# Patient Record
Sex: FEMALE | Race: WHITE | NOT HISPANIC OR LATINO | Employment: STUDENT | URBAN - METROPOLITAN AREA
[De-identification: names, ages, dates, MRNs, and addresses within clinical notes are randomized per-mention and may not be internally consistent; named-entity substitution may affect disease eponyms.]

---
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
## 2025-02-07 ENCOUNTER — OFFICE VISIT (OUTPATIENT)
Dept: URGENT CARE | Facility: CLINIC | Age: 20
End: 2025-02-07
Payer: COMMERCIAL

## 2025-02-07 VITALS
HEIGHT: 64 IN | TEMPERATURE: 98 F | DIASTOLIC BLOOD PRESSURE: 86 MMHG | OXYGEN SATURATION: 98 % | RESPIRATION RATE: 16 BRPM | SYSTOLIC BLOOD PRESSURE: 129 MMHG | HEART RATE: 82 BPM | WEIGHT: 210 LBS | BODY MASS INDEX: 35.85 KG/M2

## 2025-02-07 DIAGNOSIS — B37.2 CANDIDAL INTERTRIGO: ICD-10-CM

## 2025-02-07 DIAGNOSIS — L30.8 DERMATITIS ASSOCIATED WITH MOISTURE: Primary | ICD-10-CM

## 2025-02-07 DIAGNOSIS — L04.9 ACUTE LYMPHADENITIS: ICD-10-CM

## 2025-02-07 PROCEDURE — 99499 UNLISTED E&M SERVICE: CPT | Mod: S$GLB,,, | Performed by: NURSE PRACTITIONER

## 2025-02-07 RX ORDER — CLOTRIMAZOLE AND BETAMETHASONE DIPROPIONATE 10; .64 MG/G; MG/G
CREAM TOPICAL 2 TIMES DAILY
Qty: 15 G | Refills: 0 | Status: SHIPPED | OUTPATIENT
Start: 2025-02-07

## 2025-02-07 NOTE — PATIENT INSTRUCTIONS
Please follow up with your primary care provider within 2-5 days if your signs and symptoms have not resolved or worsen.     If your condition worsens or fails to improve we recommend that you receive another evaluation at the emergency room immediately or contact your primary medical clinic to discuss your concerns.   You must understand that you have received an Urgent Care treatment only and that you may be released before all of your medical problems are known or treated. You, the patient, will arrange for follow up care as instructed.     Use barrier to avoid skin on skin contact.  Use absorbent material or clothing such as cotton to separate skin folds.  Daily use of drying powders containing microporous cellulose.  Aerate the affected area when feasible.  Daily cleansing of the skin fold area with a mild cleanser followed by drying of skin with a hair dryer on a cool setting. You may apply Zinc Oxide cream to area to provide skin barrier.    Return to clinic next Monday or Tuesday for re-evaluation.

## 2025-02-07 NOTE — PROGRESS NOTES
"Subjective:      Patient ID: Simon Petty is a 19 y.o. female.    Vitals:  height is 5' 4" (1.626 m) and weight is 95.3 kg (210 lb). Her temperature is 97.9 °F (36.6 °C). Her blood pressure is 129/86 and her pulse is 82. Her respiration is 16 and oxygen saturation is 98%.     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 She says there is a little knot there as well that hurts to the touch. She does not believe there is any cause for an allergic reaction.     Rash  Pertinent negatives include no fatigue or fever.     Constitution: Negative for appetite change, chills, sweating, fatigue, fever and generalized weakness.   Skin:  Positive for rash and lesion. Negative for hives.   Allergic/Immunologic: Positive for itching. Negative for eczema and hives.   Neurological:  Negative for disorientation and altered mental status.   Psychiatric/Behavioral:  Negative for altered mental status, disorientation and confusion.       Objective:     Physical Exam   Constitutional: She is oriented to person, place, and time.   HENT:   Head: Normocephalic and atraumatic.   Mouth/Throat: Mucous membranes are moist.   Cardiovascular: Normal rate.   Pulmonary/Chest: Effort normal.   Abdominal: Normal appearance.   Lymphadenopathy:     Left upper body: No axillary adenopathy present.  Neurological: She is alert and oriented to person, place, and time.   Skin: Skin is warm, dry and rash.              Comments: Localized moist, slightly excoriated and erythematous patch under the left breast specifically located within the inframammary skin fold. The patch margins are dry with skin peeling but no fissuring. Few scattered erythematous satellite papules are present.   Just inferior to the dermatitis patch is a pinpoint palpable (not visible)rubbery nodule which is tender to touch.   No signs of secondary bacterial infection.   Psychiatric: Her behavior is normal. Mood normal.   Nursing note " and vitals reviewed.      Assessment:     1. Dermatitis associated with moisture    2. Candidal intertrigo    3. Acute lymphadenitis        Plan:       Dermatitis associated with moisture  -     clotrimazole-betamethasone 1-0.05% (LOTRISONE) cream; Apply topically 2 (two) times daily.  Dispense: 15 g; Refill: 0    Candidal intertrigo  -     clotrimazole-betamethasone 1-0.05% (LOTRISONE) cream; Apply topically 2 (two) times daily.  Dispense: 15 g; Refill: 0    Acute lymphadenitis          Medical Decision Making:   Urgent Care Management:  Suspect tender nodule to left chest wall (inferior to left breast) is reactive lymphadenitis 2/2 local cutaneous infection.  Will reassess on follow up.      Patient Instructions   Please follow up with your primary care provider within 2-5 days if your signs and symptoms have not resolved or worsen.     If your condition worsens or fails to improve we recommend that you receive another evaluation at the emergency room immediately or contact your primary medical clinic to discuss your concerns.   You must understand that you have received an Urgent Care treatment only and that you may be released before all of your medical problems are known or treated. You, the patient, will arrange for follow up care as instructed.     Use barrier to avoid skin on skin contact.  Use absorbent material or clothing such as cotton to separate skin folds.  Daily use of drying powders containing microporous cellulose.  Aerate the affected area when feasible.  Daily cleansing of the skin fold area with a mild cleanser followed by drying of skin with a hair dryer on a cool setting. You may apply Zinc Oxide cream to area to provide skin barrier.    Return to clinic next Monday or Tuesday for re-evaluation.

## 2025-02-10 ENCOUNTER — OFFICE VISIT (OUTPATIENT)
Dept: URGENT CARE | Facility: CLINIC | Age: 20
End: 2025-02-10
Payer: COMMERCIAL

## 2025-02-10 VITALS
HEIGHT: 64 IN | WEIGHT: 210 LBS | HEART RATE: 72 BPM | BODY MASS INDEX: 35.85 KG/M2 | SYSTOLIC BLOOD PRESSURE: 137 MMHG | OXYGEN SATURATION: 98 % | RESPIRATION RATE: 16 BRPM | DIASTOLIC BLOOD PRESSURE: 81 MMHG | TEMPERATURE: 98 F

## 2025-02-10 DIAGNOSIS — L04.9 ACUTE LYMPHADENITIS: Primary | ICD-10-CM

## 2025-02-10 DIAGNOSIS — B37.2 CANDIDAL INTERTRIGO: ICD-10-CM

## 2025-02-10 DIAGNOSIS — Z09 FOLLOW-UP EXAM: ICD-10-CM

## 2025-02-10 PROCEDURE — 99499 UNLISTED E&M SERVICE: CPT | Mod: S$GLB,,, | Performed by: NURSE PRACTITIONER

## 2025-02-10 NOTE — PATIENT INSTRUCTIONS
Please follow up with your primary care provider within 2-5 days if your signs and symptoms have not resolved or worsen.     If your condition worsens or fails to improve we recommend that you receive another evaluation at the emergency room immediately or contact your primary medical clinic to discuss your concerns.   You must understand that you have received an Urgent Care treatment only and that you may be released before all of your medical problems are known or treated. You, the patient, will arrange for follow up care as instructed.     Continue using Clotrimazole/betamethasone cream as prescribed. Continue treatment for 3-4 days following resolution of rash.  Keep your scheduled OB/gyn appointment this Friday.  Return here on Monday for reassessment of reactive lymph node.

## 2025-02-10 NOTE — PROGRESS NOTES
"Subjective:      Patient ID: Simon Petty is a 19 y.o. female.    Vitals:  height is 5' 4" (1.626 m) and weight is 95.3 kg (210 lb). Her temperature is 97.8 °F (36.6 °C). Her blood pressure is 137/81 and her pulse is 72. Her respiration is 16 and oxygen saturation is 98%.     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 Has been using the Clotrimazole/betamethasone cream and has noticed significant improvement in rash, however she still reports tender knot to left upper abdominal area.    Rash  Pertinent negatives include no fatigue or fever.     Constitution: Negative for appetite change, chills, sweating, fatigue, fever and generalized weakness.   Skin:  Positive for rash. Negative for pale, lesion and hives.   Allergic/Immunologic: Positive for itching. Negative for eczema and hives.   Neurological:  Negative for disorientation and altered mental status.   Psychiatric/Behavioral:  Negative for altered mental status, disorientation and confusion.       Objective:     Physical Exam   Constitutional: She is oriented to person, place, and time.   HENT:   Head: Normocephalic and atraumatic.   Mouth/Throat: Mucous membranes are moist.   Cardiovascular: Normal rate.   Pulmonary/Chest: Effort normal.   Abdominal: Normal appearance.   Lymphadenopathy:     Left upper body: No axillary adenopathy present.  Neurological: She is alert and oriented to person, place, and time.   Skin: Skin is warm, dry and rash.              Comments: Previous site of candidal intertrigo to left inframammary fold is resolved.  Just inferior to the previously noted dermatitis patch is a pinpoint palpable (not visible)rubbery nodule which is tender to touch and is still present, however it is not significantly changed in location, size, or character.     Psychiatric: Her behavior is normal. Mood normal.   Nursing note and vitals reviewed.      Assessment:     1. Acute lymphadenitis  "   2. Follow-up exam    3. Candidal intertrigo        Plan:       Acute lymphadenitis    Follow-up exam    Candidal intertrigo      Patient Instructions   Please follow up with your primary care provider within 2-5 days if your signs and symptoms have not resolved or worsen.     If your condition worsens or fails to improve we recommend that you receive another evaluation at the emergency room immediately or contact your primary medical clinic to discuss your concerns.   You must understand that you have received an Urgent Care treatment only and that you may be released before all of your medical problems are known or treated. You, the patient, will arrange for follow up care as instructed.     Continue using Clotrimazole/betamethasone cream as prescribed. Continue treatment for 3-4 days following resolution of rash.  Keep your scheduled OB/gyn appointment this Friday.  Return here on Monday for reassessment of reactive lymph node.       Medical Decision Making:   Urgent Care Management:  Advised pt to continue monitoring site of lymphadenitis for any changes. If changes are noted, she is to return here for re-exam. Otherwise, she was instructed to return here on Monday for re-exam and consideration of possible need for soft tissue ultrasound.  She plans to discuss this with her OB/gyn at her appointment this Friday and agrees to return here Monday.

## 2025-02-17 ENCOUNTER — OFFICE VISIT (OUTPATIENT)
Dept: URGENT CARE | Facility: CLINIC | Age: 20
End: 2025-02-17
Payer: COMMERCIAL

## 2025-02-17 VITALS
BODY MASS INDEX: 35.85 KG/M2 | HEART RATE: 71 BPM | WEIGHT: 210 LBS | SYSTOLIC BLOOD PRESSURE: 135 MMHG | DIASTOLIC BLOOD PRESSURE: 84 MMHG | HEIGHT: 64 IN | RESPIRATION RATE: 16 BRPM | OXYGEN SATURATION: 98 % | TEMPERATURE: 99 F

## 2025-02-17 DIAGNOSIS — J06.9 UPPER RESPIRATORY INFECTION WITH COUGH AND CONGESTION: Primary | ICD-10-CM

## 2025-02-17 DIAGNOSIS — R05.9 COUGH, UNSPECIFIED TYPE: ICD-10-CM

## 2025-02-17 DIAGNOSIS — J02.9 SORE THROAT: ICD-10-CM

## 2025-02-17 DIAGNOSIS — R09.81 NASAL CONGESTION: ICD-10-CM

## 2025-02-17 LAB
CTP QC/QA: YES
CTP QC/QA: YES
POC MOLECULAR INFLUENZA A AGN: NEGATIVE
POC MOLECULAR INFLUENZA B AGN: NEGATIVE
SARS-COV-2 RDRP RESP QL NAA+PROBE: NEGATIVE

## 2025-02-17 NOTE — LETTER
February 17, 2025      Urgent Care - [address] [address]  Phone: [phone]  Fax: [phone]       Patient: Simon Petty   YOB: 2005  Date of Visit: 02/17/2025    To Whom It May Concern:    Mirtha Petty  was at [facility] on 02/17/2025. The patient may return to work/school on 2/18/2025 with no restrictions. If you have any questions or concerns, or if I can be of further assistance, please do not hesitate to contact me.    Sincerely,    Smita Frost NP

## 2025-02-17 NOTE — PROGRESS NOTES
"Subjective:      Patient ID: Simon Petty is a 19 y.o. female.    Vitals:  height is 5' 4" (1.626 m) and weight is 95.3 kg (210 lb). Her oral temperature is 98.5 °F (36.9 °C). Her blood pressure is 135/84 and her pulse is 71. Her respiration is 16 and oxygen saturation is 98%.     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 The current episode started in the past 7 days. The problem has been gradually worsening. The pain is at a severity of 4/10. The pain is mild. Associated symptoms include congestion, coughing and headaches. Pertinent negatives include no ear pain, neck pain, shortness of breath or vomiting.       Constitution: Positive for chills, sweating and fatigue. Negative for appetite change and fever.   HENT:  Positive for congestion, sinus pressure and sore throat. Negative for ear pain and postnasal drip.    Neck: Negative for neck pain, neck stiffness and painful lymph nodes.   Cardiovascular:  Negative for chest pain, leg swelling, palpitations and sob on exertion.   Respiratory:  Positive for cough. Negative for sputum production and shortness of breath.    Gastrointestinal:  Negative for nausea and vomiting.   Musculoskeletal:  Positive for muscle ache.   Skin:  Negative for color change, pale and rash.   Neurological:  Positive for headaches. Negative for disorientation and altered mental status.   Hematologic/Lymphatic: Negative for swollen lymph nodes.   Psychiatric/Behavioral:  Negative for altered mental status, disorientation and confusion.       Objective:     Physical Exam   Constitutional: She is oriented to person, place, and time. She appears well-developed. She is cooperative.  Non-toxic appearance. She does not appear ill. No distress.   HENT:   Head: Normocephalic and atraumatic.   Ears:   Right Ear: " Hearing normal.   Left Ear: Hearing normal.   Nose: Mucosal edema and congestion present. No rhinorrhea or nasal deformity. No epistaxis. Right sinus exhibits maxillary sinus tenderness. Right sinus exhibits no frontal sinus tenderness. Left sinus exhibits no maxillary sinus tenderness and no frontal sinus tenderness.   Mouth/Throat: Uvula is midline and mucous membranes are normal. Mucous membranes are moist. No trismus in the jaw. Normal dentition. No uvula swelling. Posterior oropharyngeal erythema and cobblestoning present. No oropharyngeal exudate or posterior oropharyngeal edema. No tonsillar exudate.   Eyes: Conjunctivae and lids are normal. No scleral icterus.   Neck: Trachea normal and phonation normal. Neck supple. No edema present. No erythema present. No neck rigidity present.   Cardiovascular: Normal rate, regular rhythm, normal heart sounds and normal pulses.   Pulmonary/Chest: Effort normal and breath sounds normal. No respiratory distress. She has no decreased breath sounds. She has no rhonchi.   Abdominal: Normal appearance.   Musculoskeletal: Normal range of motion.         General: No deformity. Normal range of motion.   Lymphadenopathy:     She has cervical adenopathy.        Right cervical: No superficial cervical adenopathy present.       Left cervical: No superficial cervical adenopathy present.   Neurological: She is alert and oriented to person, place, and time. She exhibits normal muscle tone. Coordination normal.   Skin: Skin is warm, dry, intact, not diaphoretic and not pale.   Psychiatric: Her speech is normal and behavior is normal. Judgment and thought content normal.   Nursing note and vitals reviewed.      Assessment:     1. Upper respiratory infection with cough and congestion    2. Sore throat    3. Nasal congestion    4. Cough, unspecified type        Plan:     Office Visit on 02/17/2025   Component Date Value Ref Range Status    POC Rapid COVID 02/17/2025 Negative  Negative Final      Acceptable 02/17/2025 Yes   Final    POC Molecular Influenza A Ag 02/17/2025 Negative  Negative Final    POC Molecular Influenza B Ag 02/17/2025 Negative  Negative Final     Acceptable 02/17/2025 Yes   Final           Upper respiratory infection with cough and congestion  -     dexbrompheniramine-phenylep-DM 2-10-20 mg Tab; Take 1 tablet by mouth every 6 to 8 hours as needed (congestion/cough/allergy symptoms).  Dispense: 15 tablet; Refill: 0    Sore throat  -     POCT COVID-19 Rapid Screening  -     POCT Influenza A/B MOLECULAR    Nasal congestion  -     POCT COVID-19 Rapid Screening  -     POCT Influenza A/B MOLECULAR    Cough, unspecified type  -     POCT COVID-19 Rapid Screening  -     POCT Influenza A/B MOLECULAR          Medical Decision Making:   Urgent Care Management:  ** She followed up with her OB/gyn last week who agreed with my prior assessment of the left inframammary lymphadenitis; OBGyn plans to follow the clinical progression of this finding and will order ultrasound if unresolved and indicated in 6 weeks.         Patient Instructions   Please follow up with your primary care provider within 2-5 days if your signs and symptoms have not resolved or worsen.     If your condition worsens or fails to improve we recommend that you receive another evaluation at the emergency room immediately or contact your primary medical clinic to discuss your concerns.   You must understand that you have received an Urgent Care treatment only and that you may be released before all of your medical problems are known or treated. You, the patient, will arrange for follow up care as instructed.       Alternate tylenol/Motrin as needed for fever.  Increase oral fluids.  Rest.  OTC throat pain mixture Instructions    Combine the following ingredients:    10 ml of Maalox (Aluminum-magnesium hydroxide-simethicone 200-200-20 mg/5 ml) PLUS    10 ml of Tylenol (Acetaminophen 160 mg/5 ml)  PLUS     5 ml of Benadryl (Diphenhydramine 12.5 mg/5 ml)     Take mixture as a single dose; gargle then swallow every 6 hours as needed for throat pain relief.  Refrigerate mixture for optimal comfort/pain relief.  You have tested NEGATIVE for COVID-19 today, however it is recommended that you repeat testing using a OTC self test kit in the next 1-2 days for confirmation.  The CDC encourages you to follow these strategies to help prevent the spread of Respiratory Viruses when you are sick:  -Stay home and away from others until you are FEVER FREE (without the use of fever reducing medications) AND your symptoms have improved

## 2025-02-17 NOTE — PATIENT INSTRUCTIONS
Please follow up with your primary care provider within 2-5 days if your signs and symptoms have not resolved or worsen.     If your condition worsens or fails to improve we recommend that you receive another evaluation at the emergency room immediately or contact your primary medical clinic to discuss your concerns.   You must understand that you have received an Urgent Care treatment only and that you may be released before all of your medical problems are known or treated. You, the patient, will arrange for follow up care as instructed.     Please continue to monitor the inflamed lymph node under your breast and follow up with your OB/gyn as planned.  Alternate tylenol/Motrin as needed for fever.  Increase oral fluids.  Rest.  OTC throat pain mixture Instructions    Combine the following ingredients:    10 ml of Maalox (Aluminum-magnesium hydroxide-simethicone 200-200-20 mg/5 ml) PLUS    10 ml of Tylenol (Acetaminophen 160 mg/5 ml) PLUS     5 ml of Benadryl (Diphenhydramine 12.5 mg/5 ml)     Take mixture as a single dose; gargle then swallow every 6 hours as needed for throat pain relief.  Refrigerate mixture for optimal comfort/pain relief.  You have tested NEGATIVE for COVID-19 today, however it is recommended that you repeat testing using a OTC self test kit in the next 1-2 days for confirmation.  The CDC encourages you to follow these strategies to help prevent the spread of Respiratory Viruses when you are sick:  -Stay home and away from others until you are FEVER FREE (without the use of fever reducing medications) AND your symptoms have improved

## 2025-04-14 ENCOUNTER — OFFICE VISIT (OUTPATIENT)
Dept: URGENT CARE | Facility: CLINIC | Age: 20
End: 2025-04-14
Payer: COMMERCIAL

## 2025-04-14 VITALS
SYSTOLIC BLOOD PRESSURE: 139 MMHG | HEIGHT: 64 IN | TEMPERATURE: 98 F | OXYGEN SATURATION: 97 % | DIASTOLIC BLOOD PRESSURE: 81 MMHG | HEART RATE: 88 BPM | WEIGHT: 210 LBS | RESPIRATION RATE: 16 BRPM | BODY MASS INDEX: 35.85 KG/M2

## 2025-04-14 DIAGNOSIS — L55.1 SUNBURN OF SECOND DEGREE: ICD-10-CM

## 2025-04-14 DIAGNOSIS — L55.0 SUNBURN OF FIRST DEGREE: Primary | ICD-10-CM

## 2025-04-14 PROCEDURE — 99499 UNLISTED E&M SERVICE: CPT | Mod: S$GLB,,, | Performed by: NURSE PRACTITIONER

## 2025-04-14 RX ORDER — NORETHINDRONE ACETATE/ETHINYL ESTRADIOL AND FERROUS FUMARATE 1MG-20(21)
1 KIT ORAL
COMMUNITY
Start: 2025-02-14

## 2025-04-14 NOTE — PROGRESS NOTES
"Subjective:      Patient ID: Simon Petty is a 19 y.o. female.    Vitals:  height is 5' 4" (1.626 m) and weight is 95.3 kg (210 lb). Her oral temperature is 97.9 °F (36.6 °C). Her blood pressure is 139/81 and her pulse is 88. Her respiration is 16 and oxygen saturation is 97%.     Chief Complaint: Sunburn    Patient is an [employer] student presenting for: sunburn on face  -c/o pain and blisters  -wants medication to treat      Sunburn  This is a new problem. The current episode started in the past 7 days. The problem occurs constantly. The problem has been unchanged. Pertinent negatives include no chills, diaphoresis, fatigue, fever or myalgias.       Constitution: Negative for chills, sweating, fatigue and fever.   HENT:  Positive for facial swelling.    Musculoskeletal:  Negative for muscle ache.   Skin:  Positive for erythema.   Neurological:  Negative for disorientation and altered mental status.   Psychiatric/Behavioral:  Negative for altered mental status, disorientation and confusion.       Objective:     Physical Exam   Constitutional: She is oriented to person, place, and time.   HENT:   Head: Normocephalic and atraumatic.   Mouth/Throat: Mucous membranes are moist.   Cardiovascular: Normal rate.   Pulmonary/Chest: Effort normal.   Abdominal: Normal appearance.   Musculoskeletal: Normal range of motion.         General: Normal range of motion.   Neurological: She is alert and oriented to person, place, and time.   Skin: erythema         Comments: 2nd degree sunburn to face  1st degree sunburn to back   Psychiatric: Her behavior is normal. Mood normal.   Nursing note and vitals reviewed.      Assessment:     1. Sunburn of first degree    2. Sunburn of second degree        Plan:       Sunburn of first degree    Sunburn of second degree                Patient Instructions   Please follow up with your primary care provider within 2-5 days if your signs and symptoms have not resolved or worsen.     If your condition " worsens or fails to improve we recommend that you receive another evaluation at the emergency room immediately or contact your primary medical clinic to discuss your concerns.   You must understand that you have received an Urgent Care treatment only and that you may be released before all of your medical problems are known or treated. You, the patient, will arrange for follow up care as instructed.     Cold compresses, Aloe Vera, hydrate with fluids, OTC Ibuprofen

## 2025-04-14 NOTE — PATIENT INSTRUCTIONS
Please follow up with your primary care provider within 2-5 days if your signs and symptoms have not resolved or worsen.     If your condition worsens or fails to improve we recommend that you receive another evaluation at the emergency room immediately or contact your primary medical clinic to discuss your concerns.   You must understand that you have received an Urgent Care treatment only and that you may be released before all of your medical problems are known or treated. You, the patient, will arrange for follow up care as instructed.     Cold compresses, Aloe Vera, hydrate with fluids, OTC Ibuprofen